# Patient Record
Sex: MALE
[De-identification: names, ages, dates, MRNs, and addresses within clinical notes are randomized per-mention and may not be internally consistent; named-entity substitution may affect disease eponyms.]

---

## 2018-03-21 ENCOUNTER — HOSPITAL ENCOUNTER (EMERGENCY)
Dept: HOSPITAL 25 - UCEAST | Age: 28
Discharge: HOME | End: 2018-03-21
Payer: COMMERCIAL

## 2018-03-21 DIAGNOSIS — J11.1: Primary | ICD-10-CM

## 2018-03-21 DIAGNOSIS — Z88.2: ICD-10-CM

## 2018-03-21 PROCEDURE — 99202 OFFICE O/P NEW SF 15 MIN: CPT

## 2018-03-21 PROCEDURE — G0463 HOSPITAL OUTPT CLINIC VISIT: HCPCS

## 2018-03-21 PROCEDURE — 87502 INFLUENZA DNA AMP PROBE: CPT

## 2018-03-21 PROCEDURE — 87651 STREP A DNA AMP PROBE: CPT

## 2018-03-21 NOTE — UC
Respiratory Complaint HPI





- History of Current Complaint


Chief Complaint: UCGeneralIllness


Stated Complaint: COUGH, SORE THROAT


Time Seen by Provider: 03/21/18 15:57


Pain Intensity: 0





- Allergies/Home Medications


Allergies/Adverse Reactions: 


 Allergies











Allergy/AdvReac Type Severity Reaction Status Date / Time


 


Sulfa (Sulfonamide Allergy  See Comment Verified 03/21/18 16:05





Antibiotics)     











Home Medications: 


 Home Medications





NK [No Home Medications Reported]  03/21/18 [History Confirmed 03/21/18]











PMH/Surg Hx/FS Hx/Imm Hx





- Surgical History


Surgical History: None





- Social History


Alcohol Use: Weekly


Substance Use Type: None


Smoking Status (MU): Never Smoked Tobacco





Physical Exam


Vital Signs: 


 Initial Vital Signs











Temp  99.2 F   03/21/18 16:06


 


Pulse  101   03/21/18 16:06


 


Resp  18   03/21/18 16:06


 


BP  126/89   03/21/18 16:06


 


Pulse Ox  99   03/21/18 16:06














UC Diagnostic Evaluation





- Laboratory


O2 Sat by Pulse Oximetry: 99





Discharge





- Discharge Plan


Referrals: 


No Primary Care Phys,NOPCP [Primary Care Provider] -

## 2018-03-21 NOTE — UC
John CLINE Rebecca, scribed for Tolu Banuelos MD on 03/21/18 at 1705 .





 General HPI





- HPI Summary


HPI Summary: 


Patient is a 28 y/o M who presents to [facility] c/o sore throat, myalgias, low 

grade subjective fever and rhinorrhea for 1 week. Sx aggravated and alleviated 

by nothing. Denies cough and SOB. Wife is currently experiencing similar 

symptoms with a positive diagnosis of influenza. 





- History of Current Complaint


Chief Complaint: UCGeneralIllness


Stated Complaint: COUGH, SORE THROAT


Time Seen by Provider: 03/21/18 15:57


Hx Obtained From: Patient


Onset/Duration: Lasting Weeks - 1 week, Still Present


Current Severity: None


Pain Intensity: 0


Aggravating: Nothing


Alleviating: Nothing


Associated Signs & Symptoms: Positive: Fever, Other - Sore throat, myalgias, 

rhinorrhea





- Allergy/Home Medications


Allergies/Adverse Reactions: 


 Allergies











Allergy/AdvReac Type Severity Reaction Status Date / Time


 


Sulfa (Sulfonamide Allergy  See Comment Verified 03/21/18 16:05





Antibiotics)     














PMH/Surg Hx/FS Hx/Imm Hx





- Additional Past Medical History


Additional PMH: 





No PMHx: HTN, DM, CAD





- Surgical History


Surgical History: None





- Family History


Known Family History: 


   Negative: Hypertension





- Social History


Alcohol Use: Weekly


Substance Use Type: None


Smoking Status (MU): Never Smoked Tobacco





Review of Systems


Constitutional: Fever


Skin: Negative


Eyes: Negative


ENT: Sore Throat, Nasal Discharge


Respiratory: Negative


Cardiovascular: Negative


Gastrointestinal: Negative


Genitourinary: Negative


Motor: Negative


Neurovascular: Negative


Musculoskeletal: Myalgia


Neurological: Negative


Psychological: Negative


All Other Systems Reviewed And Are Negative: Yes





Physical Exam





- Summary


Physical Exam Summary: 





VITAL SIGNS: Reviewed.


GENERAL: ~Patient is a well developed and nourished male who is lying 

comfortable in the stretcher. ~Patient is not in any acute respiratory distress.


HEAD AND FACE: Normocephalic


EYES: PERRLA, EOMI x 2.


EARS: Hearing grossly intact.


MOUTH: Pharyngeal erythema. Runny nose.


NECK: Supple, trachea is midline, no adenopathy, no JVD, no carotid bruit.


CHEST: Symmetric, no tenderness at palpation


LUNGS: Clear to auscultation bilaterally. No wheezing or crackles.


CVS: Regular rate and rhythm, S1 and S2 present, no murmurs or gallops 

appreciated.


ABDOMEN: Soft, non-tender. Bowel sounds are normal. No abdominal abnormal 

pulsations.


EXTREMITIES: Full ROM in all major joints, no edema, no cyanosis or clubbing.


NEURO: Alert and oriented x 3. No acute neurological deficits. Speech is normal 

and follows commands.


SKIN: Dry and warm


Triage Information Reviewed: Yes


Vital Signs: 


 Initial Vital Signs











Temp  99.2 F   03/21/18 16:06


 


Pulse  101   03/21/18 16:06


 


Resp  18   03/21/18 16:06


 


BP  126/89   03/21/18 16:06


 


Pulse Ox  99   03/21/18 16:06











Vital Signs Reviewed: Yes





Re-Evaluation





- Re-Evaluation


  ** First Eval


Re-Evaluation Time: 16:48


Comment: Discussed results and D/C and treatment plan. Answered questions.





Course/Dx





- Course


Course Of Treatment: Patient is a 28 y/o M who presents to [facility] c/o sore 

throat, myalgias, low grade subjective fever and rhinorrhea for 1 week. Denies 

cough and SOB. Wife is currently experiencing similar symptoms with a positive 

diagnosis of influenza. Influenza A and B and strep are negative. Pt will be D/

C to home with Dx of URI/influenza with an Rx for Tamiflu. He understands and 

agrees. Allergy noted.  The patient was found to have increase BP in UC. The 

patient will follow up with PCP for better control of BP.





- Differential Dx - Multi-Symptom


Provider Diagnoses: URI/Influenza





Discharge





- Sign-Out/Discharge


Documenting (check all that apply): Discharge





- Discharge Plan


Condition: Stable


Disposition: HOME


Prescriptions: 


Oseltamivir CAP* [Tamiflu CAP*] 75 mg PO BID #10 cap


Patient Education Materials:  Influenza (DC)


Forms:  *Work Release


Referrals: 


[facility] [place]


No Primary Care Phys,NOPCP [Primary Care Provider] - 


Additional Instructions: 





Take medications as instructed


Increase your fluid intake


Return to the UC if symptoms worsen





FOLLOW UP WITH YOUR PRIMARY CARE PROVIDER WITHIN ONE WEEK FOR HIGH BLOOD 

PRESSURE NOTED TODAY.








- Billing Disposition and Condition


Condition: STABLE


Disposition: HOME





The documentation as recorded by the John sifuentes Rebecca accurately 

reflects the service I personally performed and the decisions made by me, Tolu Banuelos MD.

## 2019-07-06 ENCOUNTER — HOSPITAL ENCOUNTER (EMERGENCY)
Dept: HOSPITAL 25 - UCEAST | Age: 29
Discharge: HOME | End: 2019-07-06
Payer: COMMERCIAL

## 2019-07-06 VITALS — SYSTOLIC BLOOD PRESSURE: 125 MMHG | DIASTOLIC BLOOD PRESSURE: 83 MMHG

## 2019-07-06 DIAGNOSIS — Z88.2: Primary | ICD-10-CM

## 2019-07-06 LAB
ALBUMIN SERPL BCG-MCNC: 4.3 G/DL (ref 3.2–5.2)
ALBUMIN/GLOB SERPL: 1.7 {RATIO} (ref 1–3)
ALP SERPL-CCNC: 69 U/L (ref 34–104)
ALT SERPL W P-5'-P-CCNC: 125 U/L (ref 7–52)
ANION GAP SERPL CALC-SCNC: 9 MMOL/L (ref 2–11)
AST SERPL-CCNC: 73 U/L (ref 13–39)
BASOPHILS # BLD AUTO: 0 10^3/UL (ref 0–0.2)
BUN SERPL-MCNC: 11 MG/DL (ref 6–24)
BUN/CREAT SERPL: 11.5 (ref 8–20)
CALCIUM SERPL-MCNC: 9.2 MG/DL (ref 8.6–10.3)
CHLORIDE SERPL-SCNC: 100 MMOL/L (ref 101–111)
EOSINOPHIL # BLD AUTO: 0 10^3/UL (ref 0–0.6)
FLUAV RNA SPEC QL NAA+PROBE: NEGATIVE
FLUBV RNA SPEC QL NAA+PROBE: NEGATIVE
GLOBULIN SER CALC-MCNC: 2.6 G/DL (ref 2–4)
GLUCOSE SERPL-MCNC: 119 MG/DL (ref 70–100)
HCO3 SERPL-SCNC: 26 MMOL/L (ref 22–32)
HCT VFR BLD AUTO: 43 % (ref 42–52)
HGB BLD-MCNC: 14.9 G/DL (ref 14–18)
LYMPHOCYTES # BLD AUTO: 0.6 10^3/UL (ref 1–4.8)
MCH RBC QN AUTO: 30 PG (ref 27–31)
MCHC RBC AUTO-ENTMCNC: 35 G/DL (ref 31–36)
MCV RBC AUTO: 86 FL (ref 80–94)
MONOCYTES # BLD AUTO: 0.5 10^3/UL (ref 0–0.8)
NEUTROPHILS # BLD AUTO: 3.2 10^3/UL (ref 1.5–7.7)
NRBC # BLD AUTO: 0 10^3/UL
NRBC BLD QL AUTO: 0
PLATELET # BLD AUTO: 178 10^3/UL (ref 150–450)
POTASSIUM SERPL-SCNC: 4.2 MMOL/L (ref 3.5–5)
PROT SERPL-MCNC: 6.9 G/DL (ref 6.4–8.9)
RBC # BLD AUTO: 4.93 10^6 /UL (ref 4.18–5.48)
SODIUM SERPL-SCNC: 135 MMOL/L (ref 135–145)
VARIANT LYMPHS # BLD MANUAL: 2 % (ref 0–6)
WBC # BLD AUTO: 4.4 10^3/UL (ref 3.5–10.8)

## 2019-07-06 PROCEDURE — G0463 HOSPITAL OUTPT CLINIC VISIT: HCPCS

## 2019-07-06 PROCEDURE — 80053 COMPREHEN METABOLIC PANEL: CPT

## 2019-07-06 PROCEDURE — 99211 OFF/OP EST MAY X REQ PHY/QHP: CPT

## 2019-07-06 PROCEDURE — 85060 BLOOD SMEAR INTERPRETATION: CPT

## 2019-07-06 PROCEDURE — 85025 COMPLETE CBC W/AUTO DIFF WBC: CPT

## 2019-07-06 PROCEDURE — 86618 LYME DISEASE ANTIBODY: CPT

## 2019-07-06 PROCEDURE — 36415 COLL VENOUS BLD VENIPUNCTURE: CPT

## 2019-07-06 NOTE — UC
Throat Pain/Nasal Shaun HPI





- HPI Summary


HPI Summary: 





29 yo male presents with body aches and fever for the last 3 days. He tells me 

that his symptoms began with body aches and tiredness 3 days ago. Has been 

feeling hot/cold, but has not taken his temperature. He has been taking motrin 

OTC which helps. He does spend some time outdoors, but does not recall any tick 

bites over the last few weeks/months. He states "I think i have the flu". No 

sick contacts that he is aware of. He is eating and drinking well, but does 

have some nausea intermittently. Denies sinus symptoms, sore throat, cough, rash

, SOB, chest pain/palpitations, abdominal pain, vomiting, diarrhea, dysuria. 

Last had motrin last night.





- History of Current Complaint


Stated Complaint: FLU LIKE SYMP


Time Seen by Provider: 07/06/19 08:06


Hx Obtained From: Patient


Onset/Duration: Sudden Onset


Severity: Mild


Pain Intensity: 3


Pain Scale Used: 0-10 Numeric





- Allergies/Home Medications


Allergies/Adverse Reactions: 


 Allergies











Allergy/AdvReac Type Severity Reaction Status Date / Time


 


Sulfa (Sulfonamide Allergy  See Comment Verified 07/06/19 08:17





Antibiotics)     











Home Medications: 


 Home Medications





Ibuprofen TAB* [Advil TAB*] 200 mg PO Q6H PRN 07/06/19 [History Confirmed 07/06/ 19]











PMH/Surg Hx/FS Hx/Imm Hx





- Additional Past Medical History


Additional PMH: 





None





- Surgical History


Surgical History: None





- Family History


Known Family History: 


   Negative: Hypertension





- Social History


Occupation: Employed Full-time


Lives: With Family


Alcohol Use: Weekly


Substance Use Type: None


Smoking Status (MU): Never Smoked Tobacco





Review of Systems


All Other Systems Reviewed And Are Negative: Yes


Constitutional: Positive: Fatigue, Other - Body aches


Skin: Positive: Negative


Eyes: Positive: Negative


ENT: Positive: Negative


Respiratory: Positive: Negative


Cardiovascular: Positive: Negative


Gastrointestinal: Positive: Negative


Genitourinary: Positive: Negative


Motor: Positive: Negative


Neurovascular: Positive: Negative


Musculoskeletal: Positive: Negative


Neurological: Positive: Negative


Psychological: Positive: Negative





Physical Exam





- Summary


Physical Exam Summary: 





GENERAL: NAD. WDWN. No pain distress.


SKIN: No rashes, sores, lesions, or open wounds.


HEENT:


            Head: AT/NC


            Eyes: EOM intact. Conjunctiva clear without inflammation or 

discharge.


            Ears: Hearing grossly normal. TMs intact, no bulging, erythema, or 

edema. 


            Nose: Nasal mucosa pink and moist. NTTP maxillary and frontal 

sinus. 


            Throat: Posterior oropharynx without exudates, erythema, or 

tonsillar enlargement.  Uvula midline.


NECK: Supple. Nontender. No lymphadenopathy. 


CHEST:  CTAB. No r/r/w. No accessory muscle use. Breathing comfortably and in 

no distress.


CV:  RRR. Without m/r/g. Pulses intact. Cap refill <2seconds


NEURO: Alert. 


PSYCH: Age appropriate behavior.





Triage Information Reviewed: Yes


Vital Signs: 





Vital Signs:











Temp Pulse Resp BP Pulse Ox


 


 98.8 F   104   16   125/83   98 


 


 07/06/19 08:12  07/06/19 08:12  07/06/19 08:12  07/06/19 08:12  07/06/19 08:12








 Laboratory Tests











  07/06/19





  08:24


 


Influenza A (Rapid)  Negative


 


Influenza B (Rapid)  Negative











Vital Signs Reviewed: Yes





Throat Pain/Nasal Course/Dx





- Course


Course Of Treatment: 





POC flu negative.


Pt is well appearing, afebrile, and exam is wnl.


Suspect viral illness. Discussed this with pt and he prefers to have some 

labwork drawn today to further evaluate his symptoms. Will draw for CBC, CMP, 

and lyme. Advised to be rechecked if symptoms worsen or persist beyond 7 days 

in total.





- Differential Dx/Diagnosis


Provider Diagnosis: 


 Viral syndrome








Discharge





- Sign-Out/Discharge


Documenting (check all that apply): Patient Departure


All imaging exams completed and their final reports reviewed: No Studies





- Discharge Plan


Condition: Stable


Disposition: HOME


Patient Education Materials:  Viral Syndrome (ED)


Referrals: 


No Primary Care Phys,NOPCP [Primary Care Provider] - 


Additional Instructions: 


If you develop a fever, shortness of breath, chest pain, new or worsening 

symptoms - please call your PCP or go to the ED immediately.


 


1) Your exam today was normal and your flu test was negative.





2) You did not have a fever today and your symptoms appear viral in nature and 

shoulder improve with rest, fluids, and over the counter symptomatic treatment 

such as tylenol/ibuprofen as directed.





3) We have drawn for labwork today to further evaluate your symptoms and should 

have results in 2-3 days.





- Billing Disposition and Condition


Condition: STABLE


Disposition: Home

## 2019-07-07 NOTE — UC
- Progress Note


Progress Note: 


Lab report reviewed today:


Patient seen here for suspected viral illness


CBC is within normal range


CMP with slightly high blood glucose in the prediabetes range


There is elevation of total bilirubin along with elevation of AST and ALT


Suspect early hepatitis versus transaminitis due to infectious mononucleosis or 

viral hepatitis.  He did not have any upper respiratory symptoms


RN to call the patient Please inform patient of his lab results.  If he is not 

improving or feels about the same or has developed any new symptoms , he should 

go  to ER for further evaluation including abdominal ultrasound and may be 

advanced imaging and lab testing.If he is improved, he should follow-up with 

his primary care doctor tomorrow for further evaluation.











Course/Dx





- Diagnoses


Provider Diagnoses: 


 Viral syndrome








Discharge





- Sign-Out/Discharge


Documenting (check all that apply): Post-Discharge Follow Up


All imaging exams completed and their final reports reviewed: No Studies





- Discharge Plan


Condition: Stable


Disposition: HOME


Patient Education Materials:  Viral Syndrome (ED)


Referrals: 


No Primary Care Phys,NOPCP [Primary Care Provider] - 


Additional Instructions: 


If you develop a fever, shortness of breath, chest pain, new or worsening 

symptoms - please call your PCP or go to the ED immediately.


 


1) Your exam today was normal and your flu test was negative.





2) You did not have a fever today and your symptoms appear viral in nature and 

shoulder improve with rest, fluids, and over the counter symptomatic treatment 

such as tylenol/ibuprofen as directed.





3) We have drawn for labwork today to further evaluate your symptoms and should 

have results in 2-3 days.





- Billing Disposition and Condition


Condition: STABLE


Disposition: Home